# Patient Record
Sex: MALE | Race: WHITE | URBAN - METROPOLITAN AREA
[De-identification: names, ages, dates, MRNs, and addresses within clinical notes are randomized per-mention and may not be internally consistent; named-entity substitution may affect disease eponyms.]

---
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
## 2022-09-21 NOTE — ED PROVIDER NOTE - PRO INTERPRETER NEED 2
From: Lissett López  To: Alicia Alexander Nicholas Henley
English

## 2024-08-26 NOTE — ED PROCEDURE NOTE - PROCEDURE DATE TIME, MLM
04-Jul-2020 05:54 [Initial Consultation] : an initial pain management consultation [[email or web address]] : Ref by Dr. Boudreaux